# Patient Record
Sex: MALE | URBAN - METROPOLITAN AREA
[De-identification: names, ages, dates, MRNs, and addresses within clinical notes are randomized per-mention and may not be internally consistent; named-entity substitution may affect disease eponyms.]

---

## 2020-10-01 ENCOUNTER — APPOINTMENT (OUTPATIENT)
Age: 25
Setting detail: DERMATOLOGY
End: 2020-10-04

## 2020-10-01 VITALS — TEMPERATURE: 97.7 F

## 2020-10-01 DIAGNOSIS — L70.0 ACNE VULGARIS: ICD-10-CM

## 2020-10-01 PROCEDURE — OTHER FOLLOW UP FOR NEXT VISIT: OTHER

## 2020-10-01 PROCEDURE — 99202 OFFICE O/P NEW SF 15 MIN: CPT

## 2020-10-01 PROCEDURE — OTHER TREATMENT REGIMEN: OTHER

## 2020-10-01 PROCEDURE — OTHER PRESCRIPTION: OTHER

## 2020-10-01 PROCEDURE — OTHER COUNSELING: OTHER

## 2020-10-01 RX ORDER — PIMECROLIMUS 10 MG/G
THIN COAT CREAM TOPICAL QD
Qty: 1 | Refills: 0 | Status: ERX | COMMUNITY
Start: 2020-10-01

## 2020-10-01 ASSESSMENT — LOCATION SIMPLE DESCRIPTION DERM
LOCATION SIMPLE: RIGHT CHEEK
LOCATION SIMPLE: LEFT CHEEK
LOCATION SIMPLE: LEFT FOREHEAD
LOCATION SIMPLE: RIGHT FOREHEAD
LOCATION SIMPLE: CHIN

## 2020-10-01 ASSESSMENT — LOCATION DETAILED DESCRIPTION DERM
LOCATION DETAILED: LEFT INFERIOR CENTRAL MALAR CHEEK
LOCATION DETAILED: LEFT FOREHEAD
LOCATION DETAILED: LEFT CHIN
LOCATION DETAILED: RIGHT FOREHEAD
LOCATION DETAILED: RIGHT INFERIOR CENTRAL MALAR CHEEK

## 2020-10-01 ASSESSMENT — LOCATION ZONE DERM: LOCATION ZONE: FACE

## 2020-10-01 ASSESSMENT — SEVERITY ASSESSMENT OVERALL AMONG ALL PATIENTS
IN YOUR EXPERIENCE, AMONG ALL PATIENTS YOU HAVE SEEN WITH THIS CONDITION, HOW SEVERE IS THIS PATIENT'S CONDITION?: MULTIPLE INFLAMMATORY LESIONS BUT NONINFLAMMATORY LESIONS PREDOMINATE

## 2020-10-01 NOTE — PROCEDURE: TREATMENT REGIMEN
Initiate Treatment: Elidel
Detail Level: Zone
Samples Given: Arazlo x2, Meek info, CeraVe coupon
Otc Regimen: Gentle cleanser and moisturizer
Plan: Patient will use the Elidel to get the inflammation down. He was also strongly encouraged to stop picking at the lesions. Once the inflammation is resolved he will start using the topical medication for acne. He will call for a prescription if this is helping. Follow up in two months
